# Patient Record
Sex: FEMALE | Race: BLACK OR AFRICAN AMERICAN
[De-identification: names, ages, dates, MRNs, and addresses within clinical notes are randomized per-mention and may not be internally consistent; named-entity substitution may affect disease eponyms.]

---

## 2017-01-05 ENCOUNTER — HOSPITAL ENCOUNTER (OUTPATIENT)
Dept: HOSPITAL 62 - OD | Age: 47
End: 2017-01-05
Attending: NURSE PRACTITIONER
Payer: MEDICARE

## 2017-01-05 DIAGNOSIS — R10.9: Primary | ICD-10-CM

## 2017-01-05 PROCEDURE — 87086 URINE CULTURE/COLONY COUNT: CPT

## 2017-01-18 ENCOUNTER — HOSPITAL ENCOUNTER (OUTPATIENT)
Dept: HOSPITAL 62 - OD | Age: 47
End: 2017-01-18
Attending: FAMILY MEDICINE
Payer: MEDICARE

## 2017-01-18 DIAGNOSIS — M54.6: Primary | ICD-10-CM

## 2017-01-18 PROCEDURE — 72070 X-RAY EXAM THORAC SPINE 2VWS: CPT

## 2017-02-16 ENCOUNTER — HOSPITAL ENCOUNTER (OUTPATIENT)
Dept: HOSPITAL 62 - OD | Age: 47
End: 2017-02-16
Attending: FAMILY MEDICINE
Payer: MEDICARE

## 2017-02-16 DIAGNOSIS — Z01.818: Primary | ICD-10-CM

## 2017-02-16 PROCEDURE — 93010 ELECTROCARDIOGRAM REPORT: CPT

## 2017-02-16 PROCEDURE — 93005 ELECTROCARDIOGRAM TRACING: CPT

## 2017-05-23 ENCOUNTER — HOSPITAL ENCOUNTER (OUTPATIENT)
Dept: HOSPITAL 62 - OD | Age: 47
End: 2017-05-23
Attending: NURSE PRACTITIONER
Payer: MEDICARE

## 2017-05-23 DIAGNOSIS — R10.84: Primary | ICD-10-CM

## 2017-05-23 LAB
ALBUMIN SERPL-MCNC: 4.2 G/DL (ref 3.5–5)
ALP SERPL-CCNC: 86 U/L (ref 38–126)
ALT SERPL-CCNC: 71 U/L (ref 9–52)
ANION GAP SERPL CALC-SCNC: 11 MMOL/L (ref 5–19)
AST SERPL-CCNC: 45 U/L (ref 14–36)
BASOPHILS # BLD AUTO: 0.1 10^3/UL (ref 0–0.2)
BASOPHILS NFR BLD AUTO: 1.1 % (ref 0–2)
BILIRUB DIRECT SERPL-MCNC: 0.4 MG/DL (ref 0–0.4)
BILIRUB SERPL-MCNC: 0.5 MG/DL (ref 0.2–1.3)
BUN SERPL-MCNC: 16 MG/DL (ref 7–20)
CALCIUM: 9.8 MG/DL (ref 8.4–10.2)
CHLORIDE SERPL-SCNC: 102 MMOL/L (ref 98–107)
CO2 SERPL-SCNC: 28 MMOL/L (ref 22–30)
CREAT SERPL-MCNC: 0.71 MG/DL (ref 0.52–1.25)
EOSINOPHIL # BLD AUTO: 0.1 10^3/UL (ref 0–0.6)
EOSINOPHIL NFR BLD AUTO: 0.9 % (ref 0–6)
ERYTHROCYTE [DISTWIDTH] IN BLOOD BY AUTOMATED COUNT: 13.4 % (ref 11.5–14)
GLUCOSE SERPL-MCNC: 89 MG/DL (ref 75–110)
HCT VFR BLD CALC: 33.4 % (ref 36–47)
HGB BLD-MCNC: 10.7 G/DL (ref 12–15.5)
HGB HCT DIFFERENCE: -1.3
LIPASE SERPL-CCNC: 32.4 U/L (ref 23–300)
LYMPHOCYTES # BLD AUTO: 2.9 10^3/UL (ref 0.5–4.7)
LYMPHOCYTES NFR BLD AUTO: 50.8 % (ref 13–45)
MCH RBC QN AUTO: 26.2 PG (ref 27–33.4)
MCHC RBC AUTO-ENTMCNC: 32.2 G/DL (ref 32–36)
MCV RBC AUTO: 81 FL (ref 80–97)
MONOCYTES # BLD AUTO: 0.4 10^3/UL (ref 0.1–1.4)
MONOCYTES NFR BLD AUTO: 7.2 % (ref 3–13)
NEUTROPHILS # BLD AUTO: 2.3 10^3/UL (ref 1.7–8.2)
NEUTS SEG NFR BLD AUTO: 40 % (ref 42–78)
POTASSIUM SERPL-SCNC: 3.8 MMOL/L (ref 3.6–5)
PROT SERPL-MCNC: 7.3 G/DL (ref 6.3–8.2)
RBC # BLD AUTO: 4.11 10^6/UL (ref 3.72–5.28)
SODIUM SERPL-SCNC: 140.7 MMOL/L (ref 137–145)
WBC # BLD AUTO: 5.7 10^3/UL (ref 4–10.5)

## 2017-05-23 PROCEDURE — 83690 ASSAY OF LIPASE: CPT

## 2017-05-23 PROCEDURE — 80053 COMPREHEN METABOLIC PANEL: CPT

## 2017-05-23 PROCEDURE — 36415 COLL VENOUS BLD VENIPUNCTURE: CPT

## 2017-05-23 PROCEDURE — 85025 COMPLETE CBC W/AUTO DIFF WBC: CPT

## 2017-09-01 ENCOUNTER — HOSPITAL ENCOUNTER (OUTPATIENT)
Dept: HOSPITAL 62 - WI | Age: 47
End: 2017-09-01
Attending: FAMILY MEDICINE
Payer: MEDICARE

## 2017-09-01 DIAGNOSIS — Z12.31: Primary | ICD-10-CM

## 2017-09-01 PROCEDURE — 77067 SCR MAMMO BI INCL CAD: CPT

## 2017-09-01 PROCEDURE — G0202 SCR MAMMO BI INCL CAD: HCPCS

## 2017-09-01 NOTE — WOMENS IMAGING REPORT
EXAM DESCRIPTION:  BILAT SCREENING MAMMO W/CAD



COMPLETED DATE/TIME:  9/1/2017 8:39 am



REASON FOR STUDY:  SCREENING MAMMO Z12.31  ENCNTR SCREEN MAMMOGRAM FOR MALIGNANT NEOPLASM OF JAMIE



COMPARISON:  Multiple mammograms since 2009



TECHNIQUE:  Standard craniocaudal and mediolateral oblique views of each breast recorded using digita
l acquisition.



LIMITATIONS:  None.



FINDINGS:  Findings present which are benign by mammographic criteria.  No suspicious masses, calcifi
cations or architectural distortion.

Pertinent benign findings: Stable benign left retroareolar breast parenchymal calcification

Read with the assistance of CAD.

.[facility] - R2 Cenova Version 1.3

.[facility] - R2 Cenova Version 1.3

.[facility] - R2 Cenova Version 2.4

.[facility] - R2 Cenova Version 2.4

.[facility] - R2  Version 9.2

Benign mammographic findings may include one or more of the following:  Smooth masses, popcorn/rim/co
arse calcifications, asymmetries, post-procedure changes, and lesions with long-standing stability.



IMPRESSION:  BENIGN MAMMOGRAPHIC FINDINGS.  BIRADS 2



BREAST DENSITY:  b. There are scattered areas of fibroglandular density.



BIRAD:  2 BENIGN FINDING(S)



RECOMMENDATION:  ROUTINE SCREENING

Please consider bilateral screening tomosynthesis in September 2018



COMMENT:  The patient has been notified of the results by letter per MQSA requirements. Additional no
tification policies are in place for contacting patient with suspicious or incomplete findings.

Quality ID #225: The American College of Radiology recommends an annual screening mammogram for women
 aged 40 years or over. This facility utilizes a reminder system to ensure that all patients receive 
reminder letters, and/or direct phone calls for appointments. This includes reminders for routine scr
eening mammograms, diagnostic mammograms, or other Breast Imaging Interventions when appropriate.  Th
is patient will be placed in the appropriate reminder system.

The American College of Radiology (ACR) has developed recommendations for screening MRI of the breast
s in certain patient populations, to be used in conjunction with mammography.  Breast MRI surveillanc
e may be appropriate for women with more than 20% lifetime risk of developing breast cancer  as deter
mined by genetic testing, significant family history of the disease, or history of mantle radiation f
or Hodgkins Disease.  ACR Practice Guidelines 2008.



TECHNICAL DOCUMENTATION:  FINDING NUMBER: (1)

ASSESSMENT: (1)

JOB ID:  [id number]

 2011 [employer]- All Rights Reserved

## 2018-02-16 ENCOUNTER — HOSPITAL ENCOUNTER (OUTPATIENT)
Dept: HOSPITAL 62 - OD | Age: 48
End: 2018-02-16
Attending: FAMILY MEDICINE
Payer: MEDICARE

## 2018-02-16 DIAGNOSIS — M79.641: Primary | ICD-10-CM

## 2018-02-16 NOTE — RADIOLOGY REPORT (SQ)
EXAM DESCRIPTION:  HAND RIGHT 3 VIEWS



COMPLETED DATE/TIME:  2/16/2018 4:41 pm



REASON FOR STUDY:  PAIN IN RIGHT HAND M79.641  PAIN IN RIGHT HAND



COMPARISON:  None.



EXAM PARAMETERS:  NUMBER OF VIEWS: Three views.

TECHNIQUE: AP, lateral and oblique  radiographic images acquired of the right hand.

LIMITATIONS: None.



FINDINGS:  MINERALIZATION: Normal.

BONES: No acute fracture or dislocation.  No worrisome bone lesions.

JOINTS: Joint spaces maintained.

SOFT TISSUES: No metallic foreign bodies.

OTHER: No other significant finding.



IMPRESSION:  Nothing acute.  No fracture identified.



TECHNICAL DOCUMENTATION:  JOB ID:  [id number]

 2011 [employer]- All Rights Reserved

## 2018-06-26 ENCOUNTER — HOSPITAL ENCOUNTER (OUTPATIENT)
Dept: HOSPITAL 62 - OD | Age: 48
End: 2018-06-26
Attending: FAMILY MEDICINE
Payer: MEDICARE

## 2018-06-26 DIAGNOSIS — M25.511: Primary | ICD-10-CM

## 2018-06-26 NOTE — RADIOLOGY REPORT (SQ)
EXAM DESCRIPTION:  SHOULDER RIGHT 2 OR MORE VIEWS



COMPLETED DATE/TIME:  6/26/2018 11:15 am



REASON FOR STUDY:  PAIN IN RIGHT SHOULDER M25.511  PAIN IN RIGHT SHOULDER



COMPARISON:  None.



NUMBER OF VIEWS:  Three views.



TECHNIQUE:  Internal rotation, external rotation, and Y view images acquired of the right shoulder.



LIMITATIONS:  None.



FINDINGS:  MINERALIZATION: Osteopenic

BONES: No acute fracture or dislocation.  No worrisome bone lesions.

JOINTS: No acromioclavicular joint widening.  No glenohumeral joint dislocation.

VISUALIZED LUNGS AND RIBS: No pneumothorax.  No rib fracture.

SOFT TISSUES: No radiopaque foreign body.

OTHER: No other significant finding.



IMPRESSION:  No acute fracture or malalignment.  No narrowing of the subacromial space.  No bulky bon
y spurring.



TECHNICAL DOCUMENTATION:  JOB ID:  [id number]

 2011 [employer]- All Rights Reserved



Reading location - IP/workstation name: [facility]

## 2018-10-10 ENCOUNTER — HOSPITAL ENCOUNTER (OUTPATIENT)
Dept: HOSPITAL 62 - WI | Age: 48
End: 2018-10-10
Attending: FAMILY MEDICINE
Payer: MEDICARE

## 2018-10-10 DIAGNOSIS — Z12.31: Primary | ICD-10-CM

## 2018-10-10 PROCEDURE — 77067 SCR MAMMO BI INCL CAD: CPT

## 2018-10-10 NOTE — WOMENS IMAGING REPORT
EXAM DESCRIPTION:  BILAT SCREENING MAMMO W/CAD



COMPLETED DATE/TIME:  10/10/2018 7:47 am



REASON FOR STUDY:  SCREENING MAMMO Z12.31  ENCNTR SCREEN MAMMOGRAM FOR MALIGNANT NEOPLASM OF JAMIE



COMPARISON:  2009 through 2017.



TECHNIQUE:  Standard craniocaudal and mediolateral oblique views of each breast recorded using digita
l acquisition.



LIMITATIONS:  None.



FINDINGS:  No masses, calcifications or architectural distortion. No areas of suspicion.

Read with the assistance of CAD.

.[facility] - R2 Cenova Version 1.3

.[facility] - R2 Cenova Version 1.3

.[facility] - R2 Cenova Version 2.4

.[facility] [place] - R2 Cenova Version 2.4

.[facility] - R2  Version 9.2



IMPRESSION:  NORMAL MAMMOGRAM.  BIRADS 1.



BREAST DENSITY:  c. The breasts are heterogeneously dense, which may obscure small masses.



BIRAD:  1 NEGATIVE



RECOMMENDATION:  ROUTINE SCREENING



COMMENT:  The patient has been notified of the results by letter per SA requirements. Additional no
tification policies are in place for contacting patient with suspicious or incomplete findings.

Quality ID #225: The American College of Radiology recommends an annual screening mammogram for women
 aged 40 years or over. This facility utilizes a reminder system to ensure that all patients receive 
reminder letters, and/or direct phone calls for appointments. This includes reminders for routine scr
eening mammograms, diagnostic mammograms, or other Breast Imaging Interventions when appropriate.  Th
is patient will be placed in the appropriate reminder system.

The American College of Radiology (ACR) has developed recommendations for screening MRI of the breast
s in certain patient populations, to be used in conjunction with mammography.  Breast MRI surveillanc
e may be appropriate for women with more than 20% lifetime risk of developing breast cancer  as deter
mined by genetic testing, significant family history of the disease, or history of mantle radiation f
or Hodgkins Disease.  ACR Practice Guidelines 2008.



TECHNICAL DOCUMENTATION:  FINDING NUMBER: (1)

ASSESSMENT: (1)

JOB ID:  [id number]

 2011 [employer]- All Rights Reserved



Reading location - IP/workstation name: [email or web address]

## 2019-09-20 ENCOUNTER — HOSPITAL ENCOUNTER (OUTPATIENT)
Dept: HOSPITAL 62 - RAD | Age: 49
End: 2019-09-20
Attending: NEUROLOGICAL SURGERY
Payer: MEDICARE

## 2019-09-20 DIAGNOSIS — M46.86: Primary | ICD-10-CM

## 2019-09-20 DIAGNOSIS — M54.5: ICD-10-CM

## 2019-09-20 PROCEDURE — 78305 BONE IMAGING MULTIPLE AREAS: CPT

## 2019-09-20 PROCEDURE — A9561 TC99M OXIDRONATE: HCPCS

## 2019-09-20 NOTE — RADIOLOGY REPORT (SQ)
EXAM DESCRIPTION:  NM BONE SCAN LIMITED



COMPLETED DATE/TIME:  9/20/2019 12:53 pm



REASON FOR STUDY:  L4 LESION SEEN ON CT (9/13/19), LUMBAR PAIN (M54.5) M54.5  LOW BACK PAIN



COMPARISON:  Outside CT lumbar spine



RADIONUCLIDE AND DOSE:  21.5 millicuries Tc99m HDP.

The route of agent administration: Intravenous.



ADDITIONAL DRUGS AND DOSES:  None.



TECHNIQUE:  Routine delayed images at 3 hour post radionuclide injection acquired of the bony skeleto
n including anterior and posterior whole-body projections and additional focused images as needed.



LIMITATIONS:  None.



FINDINGS:  BONES: Mild increased uptake at L5.  This in keeping with facet arthropathy demonstrated o
n outside CT.  There is mild increased uptake in the mid dorsal spine this appears to be T7-T8 most l
ikely degenerative as well review of plain films January 18th reveal degenerative changes throughout 
the dorsal spine no obvious compression.

KIDNEYS: Symmetric excretion without obstruction.

OTHER: No other significant finding.



IMPRESSION:  Uptake at L5 consistent with facet arthropathy.  The uptake is symmetric bilaterally.

Uptake in the mid dorsal spine most likely degenerative as well.  If the patient has symptoms correla
tion with MRI or CT is recommended for further evaluation.



COMMENT:  Quality measure 147:  Current bone scan is compared with any available plain radiographs, p
rior bone scans, and CT/MRI.



TECHNICAL DOCUMENTATION:  JOB ID:  [id number]

 2011 [employer]- All Rights Reserved



Reading location - IP/workstation name: [email or web address]

## 2019-12-27 ENCOUNTER — HOSPITAL ENCOUNTER (OUTPATIENT)
Dept: HOSPITAL 62 - WI | Age: 49
End: 2019-12-27
Attending: FAMILY MEDICINE
Payer: MEDICARE

## 2019-12-27 DIAGNOSIS — Z12.31: Primary | ICD-10-CM

## 2019-12-27 PROCEDURE — 77067 SCR MAMMO BI INCL CAD: CPT

## 2019-12-30 NOTE — WOMENS IMAGING REPORT
EXAM DESCRIPTION:  BILAT SCREENING MAMMO W/CAD



COMPLETED DATE/TIME:  12/27/2019 10:55 am



REASON FOR STUDY:  Z12.31 SCREENING MAMMO Z12.31  ENCNTR SCREEN MAMMOGRAM FOR MALIGNANT NEOPLASM OF B
RE



COMPARISON:  10/10/2018 and 9/1/2017.



EXAM PARAMETERS:  Standard craniocaudal and mediolateral oblique views of each breast recorded using 
digital acquisition.

Read with the assistance of CAD.

.[facility] - [employer]  Version 9.2



LIMITATIONS:  None.



FINDINGS:  No suspicious masses, suspicious calcifications or architectural distortion. No areas of c
oncern.



IMPRESSION:  Negative MAMMOGRAM.  BIRADS 1



BREAST DENSITY:  b. There are scattered areas of fibroglandular density.



BIRAD:  ASSESSMENT:  1 NEGATIVE



RECOMMENDATION:  ROUTINE SCREENING



COMMENT:  The patient has been notified of the results by letter per MQSA requirements. Additional no
tification policies are in place for contacting patient with suspicious or incomplete findings.

Quality ID #225: The American College of Radiology recommends an annual screening mammogram for women
 aged 40 years or over. This facility utilizes a reminder system to ensure that all patients receive 
reminder letters, and/or direct phone calls for appointments. This includes reminders for routine scr
eening mammograms, diagnostic mammograms, or other Breast Imaging Interventions when appropriate.  Th
is patient will be placed in the appropriate reminder system.



TECHNICAL DOCUMENTATION:  FINDING NUMBER: (1)

ASSESSMENT: (1)

JOB ID:  [id number]

 2011 [employer]- All Rights Reserved



Reading location - IP/workstation name: [email or web address]

## 2020-01-13 ENCOUNTER — HOSPITAL ENCOUNTER (OUTPATIENT)
Dept: HOSPITAL 62 - RAD | Age: 50
End: 2020-01-13
Attending: PHYSICAL MEDICINE & REHABILITATION
Payer: MEDICARE

## 2020-01-13 DIAGNOSIS — Z86.011: ICD-10-CM

## 2020-01-13 DIAGNOSIS — J34.89: Primary | ICD-10-CM

## 2020-01-13 PROCEDURE — 70553 MRI BRAIN STEM W/O & W/DYE: CPT

## 2020-01-13 PROCEDURE — A9576 INJ PROHANCE MULTIPACK: HCPCS

## 2020-01-13 NOTE — RADIOLOGY REPORT (SQ)
EXAM DESCRIPTION:  MRI HEAD COMBO



COMPLETED DATE/TIME:  1/13/2020 4:56 pm



REASON FOR STUDY:  (Z86.011)PERSONAL HISTORY OF BENIGN NEOPLASM OF THE BRAIN Z86.011  PERSONAL HISTOR
Y OF BENIGN NEOPLASM OF THE BRAIN



COMPARISON:  4/26/2013, 1/9/2012



TECHNIQUE:  Multiplanar imaging includes noncontrasted T1, T2, FLAIR, diffusion with ADC map and post
gadolinium contrast T1 sequences. Images stored on PACS.



CONTRAST TYPE AND DOSE:  10 mL mL Dotarem.



RENAL FUNCTION:  Not indicated.  ACR Type II contrast agent associated with few, if any, unconfounded
 cases of NSF



LIMITATIONS:  None.



FINDINGS:  ANATOMY: No anomalies. Normal vascular flow voids. Pituitary fossa normal.

CSF SPACES: Normal in size and contour. No hemorrhage.

CEREBRUM: Sulci and gyri normal in size and contour. Normal white matter signal on FLAIR imaging. No 
evidence of hemorrhage, mass, or extraaxial fluid collection. No abnormal enhancement post contrast.

POSTERIOR FOSSA: No signal alteration. No hemorrhage. No edema, masses, or mass effect. Internal edward
tory canals, cerebellopontine angles, mastoids normal. No enhancing lesions. No abnormal enhancement 
post contrast.  Low lying right cerebellar tonsil is again noted.

DIFFUSION IMAGING: Negative for acute or subacute infarction.

ORBITS: No masses. Globes normal.

PARANASAL SINUSES: Mucous retention cyst or polyp is again noted in the left maxillary sinus.

OTHER: No other significant finding.



IMPRESSION:  1. No acute intracranial event.

2.  Stable low lying right cerebellar tonsil.

3.  Stable mucous retention cyst or polyp in the left maxillary sinus.

EVIDENCE OF ACUTE STROKE: NO.



TECHNICAL DOCUMENTATION:  JOB ID:  [id number]

 2011 [employer]- All Rights Reserved



Reading location - IP/workstation name: [facility] [place]

## 2021-01-21 ENCOUNTER — HOSPITAL ENCOUNTER (OUTPATIENT)
Dept: HOSPITAL 62 - WI | Age: 51
End: 2021-01-21
Attending: FAMILY MEDICINE
Payer: MEDICARE

## 2021-01-21 DIAGNOSIS — Z12.31: Primary | ICD-10-CM

## 2021-01-21 PROCEDURE — 77063 BREAST TOMOSYNTHESIS BI: CPT

## 2021-01-21 PROCEDURE — 77067 SCR MAMMO BI INCL CAD: CPT

## 2021-01-21 NOTE — WOMENS IMAGING REPORT
EXAM DESCRIPTION:  3D SCREENING MAMMO BILAT



IMAGES COMPLETED DATE/TIME:  1/21/2021 9:02 am



REASON FOR STUDY:  Z12.31 ENCOUNTER FOR SCREENING MAMMOGRAM FOR MALIGNANT NEOPLASM OF BREAST Z12.31  
ENCNTR SCREEN MAMMOGRAM FOR MALIGNANT NEOPLASM OF JAMIE



COMPARISON:  12/27/2019, 10/10/2018, 9/1/2017



EXAM PARAMETERS:  Views: Standard craniocaudal and mediolateral oblique views of each breast recorded
 using digital acquisition and breast tomosynthesis.

Read with the assistance of CAD.

.[facility] - [employer]  Version 9.2



LIMITATIONS:  None.



FINDINGS:  No suspicious masses, suspicious calcifications or architectural distortion. No areas of c
oncern.



IMPRESSION:   NEGATIVE MAMMOGRAM. BIRADS 1.



BREAST DENSITY:  b. There are scattered areas of fibroglandular density.



BIRAD:  ASSESSMENT:  1 NEGATIVE



RECOMMENDATION:  ROUTINE SCREENING



COMMENT:  The patient has been notified of the results by letter per MQSA requirements. Additional no
tification policies are in place for contacting patient with suspicious or incomplete findings.

Quality ID #225: The American College of Radiology recommends an annual screening mammogram for women
 aged 40 years or over. This facility utilizes a reminder system to ensure that all patients receive 
reminder letters, and/or direct phone calls for appointments. This includes reminders for routine scr
eening mammograms, diagnostic mammograms, or other Breast Imaging Interventions when appropriate.  Th
is patient will be placed in the appropriate reminder system.



TECHNICAL DOCUMENTATION:  FINDING NUMBER: (1)

ASSESSMENT:  (1)

JOB ID:  [id number]

 2011 [employer]- All Rights Reserved



Reading location - IP/workstation name: [email or web address]

## 2021-01-21 NOTE — XMS REPORT
Patient Summary Document

                           Created on:2021



Patient:OSCAR LAN

Sex:Female

:1970

External Reference #:[id number]





Demographics







                          Address                   [address] 



                                                    [place] [address]

 

                          Home Phone                [phone]

 

                          Work Phone                [phone]

 

                          Mobile Phone              [phone]

 

                          Email Address             [email or web address]

 

                          Preferred Language        English

 

                          Marital Status            Unknown

 

                          Caodaism Affiliation     Unknown

 

                          Race                      Unknown

 

                          Additional Race(s)        Unavailable



                                                    Black or 



                                                    Unavailable



                                                    Unavailable

 

                          Ethnic Group              Unknown









Author







                          Organization              [facility]

 

                          Address                   [facility] [address]



                                                    [place] [address]

 

                          Phone                     [phone]









Care Team Providers







                    Name                Role                Phone

 

                    Roc Livingston      Primary Care Physician Unavailable

 

                    FRANNY GUEVARA Attending Clinician Unavailable

 

                    ILYA MARIE    Attending Clinician Unavailable

 

                    MUSA SUN        Attending Clinician Unavailable

 

                    LOY             Attending Clinician Unavailable

 

                    Yara             Attending Clinician Unavailable

 

                    Yara             Attending Clinician Unavailable









Allergies, Adverse Reactions, Alerts







        Allergy Allergy Status  Severity Reaction(s) Onset   Inactive Treating C

omments



        Name    Type                            Date    Date    Clinician 

 

        Iodinated Propensity Active          Hives   2020                  



        Contrast to adverse                         1-04                    



        Media   reactions                         00:00:                  



                to drug                         00                      

 

        Potassium Propensity Active          Hives   1                  



                to adverse                         1-04                    



                reactions                         00:00:                  



                to drug                         00                      

 

        Ampicillin Propensity Active          Rash    2020-0                  



                to adverse                         3-19                    



                reactions                         00:00:                  



                to drug                         00                      

 

        Citrus And Propensity Active          Rash    2020-0                  



        Derivatives to adverse                         3-19                    



                reactions                         00:00:                  



                to drug                         00                      

 

        Citrus And Propensity Inactive         Rash    2020-0                  



        Derivatives to adverse                         3-19                    



                reactions                         00:00:                  



                to drug                         00                      

 

        Shellfish Propensity Active          Rash    2020-0                  



        Containing to adverse                         3-19                    



        Products reactions                         00:00:                  



                to drug                         00                      

 

        Ampicillin Allergy to Active  Moderate Hives   2012                  



                substance                         -15                    



                                                00:00:                  



                                                00                      

 

        Iodinated Allergy to Active  Moderate Hives   2012                  



        Contrast substance                         -15                    



        Media                                   00:00:                  



                                                00                      

 

        Iodine  Allergy to Active  Moderate Hives   2012                  



                substance                         1-15                    



                                                00:00:                  



                                                00                      

 

        Potassium Allergy to Active  Moderate Hives   2012                  



                substance                         1-15                    



                                                00:00:                  



                                                00                      

 

        Shellfish Allergy to Active                                          



        Derived substance                                                 







Medications







       Ordered Filled Start  Stop   Current Ordering Indication Dosage Frequency

 Signature

                          Comments                  Components



      Medication Medication Date  Date  Medication? Clinician                   

(SIG)       



      Name  Name                                                        

 

      tiZANidine       2020-0       Yes               2mg   [id number] Take 0.5-

1       



      (ZANAFLEX)       9-24                                [id number] tablets   

    



      4 MG tablet       00:00:                               3D    (2-4 mg      

 



                  00                                        total) by       



                                                            mouth 3       



                                                            (three)       



                                                            times       



                                                            daily as       



                                                            needed       

 

      HYDROcodone       2020-0       Yes               1{tbl} [id number] Take 1 

      



      -acetaminop       8-31                                [id number] tablet by

       



      hen (NORCO)       00:00:                               3D    mouth 3      

 



      5-325 mg       00                                        (three)       



      tablet                                                 times       



                                                            daily as       



                                                            needed       



                                                            Patient       



                                                            taking due       



                                                            to          



                                                            surgery.       

 

      predniSONE       2020-0       Yes               20mg  Q.5D  Take 20 mg    

   



      (DELTASONE)       8-22                                      by mouth 2    

   



      20 MG       00:00:                                     (two)       



      tablet       00                                        times       



                                                            daily For       



                                                            5 days.       

 

      DULoxetine       -0       Yes               30mg  QD    Take 1       



      (CYMBALTA)       1-27                                      capsule       



      30 MG DR       00:00:                                     (30 mg       



      capsule       00                                        total) by       



                                                            mouth once       



                                                            daily for       



                                                            30 days       

 

      losartan-hy                   Yes               1{tbl} QD    Take 1       



      drochloroth                                                 tablet by     

  



      iazide                                                 mouth once       



      (HYZAAR)                                                 daily       



      100-12.5 mg                                                             



      tablet                                                             

 

      fexofenadin                   Yes               180mg QD    Take 180      

 



      e (ALLEGRA)                                                 mg by       



      180 MG                                                 mouth once       



      tablet                                                 daily       

 

      acetaminoph                   Yes               650mg QD    Take 650      

 



      en                                                    mg by       



      (TYLENOL)                                                 mouth once      

 



      650 MG ER                                                 daily       



      tablet                                                             

 

      multivitami                   Yes                           Take by       



      n with                                                 mouth       



      minerals                                                             



      (HAIR,SKIN                                                             



      AND NAILS                                                             



      ORAL)                                                             

 

      aspirin 325                   Yes               325mg QD    Take 325      

 



      MG tablet                                                 mg by       



                                                            mouth once       



                                                            daily       

 

      inulin-                   Yes               2{each} QD    Take 2      

 



      mium                                                  each by       



      picolinate                                                 mouth once     

  



      2-100                                                 daily       



      gram-mcg                                                             



      Chew                                                              

 

      biotin 1 mg                   Yes               1{capsu QD    Take 1      

 



      Cap                                       le}         capsule by       



                                                            mouth once       



                                                            daily       

 

      docusate                   Yes               100mg QD    Take 100       



      (COLACE)                                                 mg by       



      100 MG                                                 mouth once       



      capsule                                                 daily       

 

      baclofen 10                   No                            baclofen      

 



      mg tablet                                                 10 mg       



                                                            tablet       

 

      diazepam 5                   No                            diazepam 5     

  



      mg tablet                                                 mg tablet       

 

      diclofenac                   No                            diclofenac     

  



      sodium 75                                                 sodium 75       



      mg                                                    mg          



      tablet,vanita                                                 tablet,del    

   



      yed release                                                 ayed        



      TAKE 1                                                 release       



      TABLET BY                                                 TAKE 1       



      MOUTH TWICE                                                 TABLET BY     

  



      A DAY                                                 MOUTH       



                                                            TWICE A       



                                                            DAY         

 

      Gavilyte-C                   No                            Gavilyte-C     

  



      240                                                   240         



      gram-22.72                                                 gram-22.72     

  



      gram-6.72                                                 gram-6.72       



      gram-5.84                                                 gram-5.84       



      gram oral                                                 gram oral       



      solution                                                 solution       

 

      losartan                   No                            losartan       



      100                                                   100         



      mg-hydrochl                                                 mg-hydroch    

   



      orothiazide                                                 lorothiazi    

   



      12.5 mg                                                 de 12.5 mg       



      tablet TK 1                                                 tablet TK     

  



      T PO  QD                                                 1 T PO QD       

 

      meloxicam                   No                            meloxicam       



      15 mg                                                 15 mg       



      tablet TK 1                                                 tablet TK     

  



      T PO QD                                                 1 T PO QD       

 

      pantoprazol                   No                            pantoprazo    

   



      e 20 mg                                                 le 20 mg       



      tablet,vanita                                                 tablet,del    

   



      yed release                                                 ayed        



      TAKE 1                                                 release       



      TABLET BY                                                 TAKE 1       



      MOUTH ONCE                                                 TABLET BY      

 



      DAILY                                                 MOUTH ONCE       



                                                            DAILY       

 

      prednisone                   No                            prednisone     

  



      20 mg                                                 20 mg       



      tablet                                                 tablet       

 

      tizanidine                   No                            tizanidine     

  



      4 mg tablet                                                 4 mg        



                                                            tablet       

 

      tramadol 50                   No                            tramadol      

 



      mg tablet                                                 50 mg       



      Take 1 q                                                 tablet       



      6-8 hrs PRN                                                 Take 1 q      

 



      pain max 2                                                 6-8 hrs       



      per day                                                 PRN pain       



      DNFU                                                  max 2 per       



      20                                                 day DNFU       



                                                            20       

 

      pregabalin                   No                            pregabalin     

  



      50 mg                                                 50 mg       



      capsule                                                 capsule       

 

      duloxetine                   No                            duloxetine     

  



      20 mg                                                 20 mg       



      capsule,del                                                 capsule,de    

   



      ayed                                                  layed       



      release                                                 release       



      TAKE 1                                                 TAKE 1       



      CAPSULE BY                                                 CAPSULE BY     

  



      MOUTH ONCE                                                 MOUTH ONCE     

  



      DAILY X6                                                 DAILY X6       



      WK, 2                                                 WK, 2       



      CAPSULES                                                 CAPSULES       



      ONCE DAILY                                                 ONCE DAILY     

  



      X6 WK, THEN                                                 X6 WK,       



      3CAPS ONCE                                                 THEN 3CAPS     

  



      DAILY                                                 ONCE DAILY       

 

      duloxetine                   No                            duloxetine     

  



      30 mg                                                 30 mg       



      capsule,del                                                 capsule,de    

   



      ayed                                                  layed       



      release                                                 release       

 

      hydroxyzine                   No                            hydroxyzin    

   



      pamoate 25                                                 e pamoate      

 



      mg capsule                                                 25 mg       



                                                            capsule       

 

       mg                   No                             mg     

  



      capsule                                                 capsule       

 

      hydrocodone                   No                            hydrocodon    

   



      5                                                     e 5         



      mg-acetamin                                                 mg-acetami    

   



      ophen 325                                                 nophen 325      

 



      mg tablet                                                 mg tablet       



      Take 1 q                                                 Take 1 q       



      6-8 hrs PRN                                                 6-8 hrs       



      pain                                                  PRN pain       

 

      polyethylen                   No                            polyethyle    

   



      e glycol                                                 ne glycol       



      3350 17                                                 3350 17       



      gram/dose                                                 gram/dose       



      oral powder                                                 oral        



      MIX 1                                                 powder MIX       



      CAPFUL WITH                                                 1 CAPFUL      

 



      8 OUNCES OF                                                 WITH 8       



      FLUID AND                                                 OUNCES OF       



      TK D                                                  FLUID AND       



                                                            TK D        

 

      Tylenol                   No                            Tylenol       



      Arthritis                                                 Arthritis       



      Pain 650 mg                                                 Pain 650      

 



      tablet,exte                                                 mg          



      nded                                                  tablet,ext       



      release                                                 ended       



                                                            release       

 

      aspirin 325                   No                            aspirin       



      mg tablet                                                 325 mg       



      Take 1                                                 tablet       



      tablet(s)                                                 Take 1       



      EVERY DAY                                                 tablet(s)       



      by oral                                                 EVERY DAY       



      route for                                                 by oral       



      10 days                                                 route for       



      starting 3                                                 10 days       



      days after                                                 starting 3     

  



      surgery                                                 days after       



                                                            surgery       

 

      Colace 100                   No                1capsul BID   Colace 100   

    



      mg capsule                                     e(s)        mg capsule     

  



      Take 1                                                 Take 1       



      capsule                                                 capsule       



      twice a day                                                 twice a       



      by oral                                                 day by       



      route as                                                 oral route       



      needed for                                                 as needed      

 



      10 days.                                                 for 10       



                                                            days.       

 

      buprenorphi                   No                1patch( Q1W   buprenorph  

     



      ne 5                                      es)         ine 5       



      mcg/hour                                                 mcg/hour       



      weekly                                                 weekly       



      transdermal                                                 transderma    

   



      patch APPLY                                                 l patch       



      1 PATCH                                                 APPLY 1       



      EXTERNALLY                                                 PATCH       



      TO THE SKIN                                                 EXTERNALLY    

   



      EVERY WEEK                                                 TO THE       



                                                            SKIN EVERY       



                                                            WEEK        

 

      oxycodone-a                   No                1     Q5H   oxycodone-    

   



      cetaminophe                                                 acetaminop    

   



      n 5 mg-325                                                 hen 5       



      mg tablet                                                 mg-325 mg       



      Take 1                                                 tablet       



      tablet                                                 Take 1       



      every 4-6                                                 tablet       



      hours by                                                 every 4-6       



      oral route                                                 hours by       



      as needed.                                                 oral route     

  



                                                            as needed.       

 

      tiZANidine              No                4mg   QD    Take 4 mg      

 



      (ZANAFLEX)                                            by mouth       



      4 MG tablet             00:00                               nightly       



                        :00                                             







Problems







        Condition Condition Condition Status  Onset   Resolution Last    Treatin

g Comments



        Name    Details Category         Date    Date    Treatment Clinician 



                                                        Date            

 

        Chronic Chronic [id number] Active  2020         



        bilateral bilateral                             14:56:33         



        low back low back                 00:00:                          



        pain    pain                    00                              



        without without                                                 



        sciatica sciatica                                                 

 

        Lumbosacral Lumbosacral [id number] Active  2020     

    



        spondylosis spondylosis                             14:56:48        

 



        without without                 00:00:                          



        myelopathy myelopathy                 00                              

 

        HSP     HSP     [id number] Active  2020         



        (hereditary (hereditary                 3-19            13:48:48        

 



        spastic spastic                 00:00:                          



        paraplegia) paraplegia)                 00                              

 

        Spastic Spastic Problem Active                            



        paralysis Paralysis                 1-07                            



                                        00:00:                          



                                        00                              

 

        Thoracic Thoracic Problem Active  2018                          



        spondylosis Spondylosis                 1-06                            



        without without                 00:00:                          



        myelopathy Myelopathy                 00                              

 

        Lumbosacral Lumbosacral Problem Active                            



        spondylosis Spondylosis                 3-27                            



        without without                 00:00:                          



        myelopathy Myelopathy                 00                              

 

        Osteoarthri Osteoarthri Problem Active                            



        tis of knee tis of Knee                 5-04                            



                                        00:00:                          



                                        00                              

 

        Chondromala Chondromala Problem Active  2014                          



        miriam of  miriam of                  1-20                            



        patella Patella                 00:00:                          



                                        00                              

 

        Chondromala Chondromala Problem Active  2014                          



        miriam     miriam                     1-20                            



                                        00:00:                          



                                        00                              

 

        Derangement Derangement Problem Active                            



        of knee of Knee                 6-13                            



                                        00:00:                          



                                        00                              

 

        Anemia  Anemia  Problem Active  2012-1                          



                                        1-15                            



                                        00:00:                          



                                        00                              

 

        Knee pain Knee Pain Problem Active  2012-1                          



                                        1-15                            



                                        00:00:                          



                                        00                              

 

        History of History of Problem Active  2012                          



        cardiovascu Cardiovascu                 1-15                            



        lar disease lar Disease                 00:00:                          



                                        00                              







Procedures







                Procedure       Date / Time Performed Performing Clinician Devic e

 

                XR, lumbar spine 2020 00:00:00                 

 

                XR, lumbar spine 2020-11-10 00:00:00                 

 

                Lumbar Spine Surgery (Surg) 2020-10-26 00:00:00                 

 

                CT, lumbar spine, w/o contrast 2020 00:00:00              

   

 

                lumbar spine surgery (SURG) 2020 00:00:00                 

 

                XR, lumbar spine 2020 00:00:00                 

 

                MRI, lumbar spine, w/o contrast 2020 00:00:00             

    

 

                XR, lumbar spine 2020 00:00:00                 

 

                MRI, brain, w/wo contrast 2019 00:00:00                 

 

                OFFICE/OUTPATIENT VISIT EST 2019 13:30:00                 

 

                MRI, lumbar spine, w/o contrast 2019 00:00:00             

    

 

                MRI, thoracic spine, w/o contrast 2019 00:00:00           

      

 

                OFFICE/OUTPATIENT VISIT EST 2018-10-08 08:15:00                 

 

                OFFICE/OUTPATIENT VISIT EST 2018 10:45:00                 

 

                OFFICE/OUTPATIENT VISIT EST 2018 15:15:00                 

 

                Repair, Collateral Ligament (Surg) 2017 00:00:00          

       

 

                OFFICE/OUTPATIENT VISIT EST 2017 10:15:00                 

 

                Hand Surgery    2017 00:00:00                 

 

                Foot/Ankle Surgery 1994 00:00:00                 

 

                Cholecystectomy                                 

 

                Knee Surgery                                    

 

                Other                                           

 

                Hysterectomy                                    

 

                HIP ARTHROSCOPY DX                                 

 

                KNEE ARTHROSCOPY/SURGERY                                 

 

                Ankle Surgery                                   







Results







           Test Description Test Time  Test Comments Text Results Atomic Results

 Result 

Comments









                HEMOGLOBIN AND HEMATOCRIT 2020-10-27 03:56:00                 









                          Test Item    Value        Reference Range Comments









                Hct VFr Bld Auto (test code = 4544-3) 34.7 %          34.1-44.9 

      

 

                Hgb Bld-mCnc (test code = 718-7) 10.5 g/dL       11.2-15.7      

 



IRON AND IRON BINDING CAPACITY PANEL2020-10-20 09:00:00





                Test Item       Value           Reference Range Comments

 

                Iron Satn MFr SerPl (test code = 2502-3) 20 %            20-50  

         

 

                TIBC SerPl-mCnc (test code = 2500-7) 253 ug/dL       250-425    

     

 

                Iron SerPl-mCnc (test code = 2498-4) 50 ug/dL             

     



FERRITIN2020-10-20 09:00:00





                Test Item       Value           Reference Range Comments

 

                Ferritin SerPl-mCnc (test code = 2276-4) 134.0 ng/mL     7.3-307

.3       



HEMOGLOBIN AND HEMATOCRIT2020-10-20 09:00:00





                Test Item       Value           Reference Range Comments

 

                Hct VFr Bld Auto (test code = 4544-3) 33.5 %          34.1-44.9 

      

 

                Hgb Bld-mCnc (test code = 718-7) 10.1 g/dL       11.2-15.7      

 



FERRITIN2020-10-05 08:41:00





                Test Item       Value           Reference Range Comments

 

                Ferritin SerPl-mCnc (test code = 2276-4) 189.8 ng/mL     7.3-307

.3       



IRON AND IRON BINDING CAPACITY PANEL2020-10-05 08:41:00





                Test Item       Value           Reference Range Comments

 

                TIBC SerPl-mCnc (test code = 2500-7) 297 ug/dL       250-425    

     

 

                Iron Satn MFr SerPl (test code = 2502-3) 33 %            20-50  

         

 

                Iron SerPl-mCnc (test code = 2498-4) 99 ug/dL             

     



Hemoglobin and Hematocrit panel - Blood2020-10-05 08:41:00





                Test Item       Value           Reference Range Comments

 

                Hemoglobin [Mass/volume] in Blood (test code = 11.3 g/dL       1

1.2-15.7       



                718-7)                                          

 

                Hematocrit [Volume Fraction] of Blood by Automated 35.6 %       

   34.1-44.9       



                count (test code = 4544-3)                                 



creatinine2020-10-05 08:41:00





                Test Item       Value           Reference Range Comments

 

                Creatinine [Mass/volume] in Serum or Plasma (test 0.87 mg/dL    

  0.55-1.02       



                code = 2160-0)                                  

 

                Glomerular filtration rate/1.73 sq M.predicted >=60             

               



                [Volume Rate/Area] in Serum or Plasma by                        

         



                Creatinine-based formula (MDRD) (test code =                    

             



                85078-5)                                        



CBC (INCLUDES DIFF/PLT)2019 10:51:00





                Test Item       Value           Reference Range Comments

 

                MPV (test code = 07250099) 11.7 fL         7.5-12.5        

 

                HEMOGLOBIN (test code = 17645944) 11.6 g/dL       11.7-15.5     

  

 

                ABSOLUTE NEUTROPHILS (test code = 27383807) 2602 cells/uL   1500

-7800       

 

                ABSOLUTE BASOPHILS (test code = 06391564) 61 cells/uL     0-200 

          

 

                RED BLOOD CELL COUNT (test code = 65813232) 4.37 Million/uL 3.80

-5.10       

 

                PLATELET COUNT (test code = 30529891) 304 Thousand/uL 140-400   

      

 

                MCH (test code = 18458917) 26.5 pg         27.0-33.0       

 

                ABSOLUTE LYMPHOCYTES (test code = 60986892) 2448 cells/uL   850-

3900        

 

                MONOCYTES (test code = 84890477) 6.6 %                          

 

 

                LYMPHOCYTES (test code = 90566026) 44.5 %                       

   

 

                EOSINOPHILS (test code = 38507191) 0.5 %                        

   

 

                RDW (test code = 63372686) 12.7 %          11.0-15.0       

 

                ABSOLUTE EOSINOPHILS (test code = 37347187) 28 cells/uL     15-5

00          

 

                HEMATOCRIT (test code = 04562313) 35.7 %          35.0-45.0     

  

 

                MCV (test code = 97961045) 81.7 fL         80.0-100.0      

 

                WHITE BLOOD CELL COUNT (test code = 5.5 Thousand/uL 3.8-10.8    

    



                77816342)                                       

 

                MCHC (test code = 62368770) 32.5 g/dL       32.0-36.0       

 

                NEUTROPHILS (test code = 52519900) 47.3 %                       

   

 

                ABSOLUTE MONOCYTES (test code = 22434930) 363 cells/uL    200-95

0         

 

                BASOPHILS (test code = 16427665) 1.1 %                          

 



BASIC METABOLIC [id number] 11:13:00





                Test Item       Value           Reference Range Comments

 

                POTASSIUM (test code = 90442825) 4.0 mmol/L      3.5-5.3        

 

 

                eGFR  (test code = 74 mL/min/1.73m2 > OR = 60   

    



                29416187)                                       

 

                GLUCOSE (test code = 07114640) 87 mg/dL        65-99           

 

                CARBON DIOXIDE (test code = 84809603) 30 mmol/L       20-32     

      

 

                BUN/CREATININE RATIO (test code = NOT APPLICABLE (calc) 6-22    

        



                07904156)                                       

 

                CHLORIDE (test code = 59268676) 102 mmol/L                

 

                eGFR NON-AFR. AMERICAN (test code = 64 mL/min/1.73m2 > OR = 60  

     



                35583757)                                       

 

                SODIUM (test code = 22826511) 138 mmol/L      135-146         

 

                CALCIUM (test code = 34860354) 9.9 mg/dL       8.6-10.4        

 

                CREATININE (test code = 52193903) 1.02 mg/dL      0.50-1.05     

  

 

                UREA NITROGEN (BUN) (test code = 23 mg/dL        7-           

 



                48660073)                                       



BASIC METABOLIC [id number] 08:45:00





                Test Item       Value           Reference Range Comments

 

                CALCIUM (test code = 93312964) 9.8 mg/dL       8.6-10.4        

 

                CHLORIDE (test code = 10900610) 105 mmol/L                

 

                UREA NITROGEN (BUN) (test code = 21 mg/dL        7-25           

 



                05625841)                                       

 

                SODIUM (test code = 00972212) 140 mmol/L      135-146         

 

                CARBON DIOXIDE (test code = 19708161) 27 mmol/L       20-32     

      

 

                POTASSIUM (test code = 52403031) 3.8 mmol/L      3.5-5.3        

 

 

                GLUCOSE (test code = 07121163) 99 mg/dL        65-99           

 

                BUN/CREATININE RATIO (test code = NOT APPLICABLE (calc) 6-22    

        



                10611783)                                       

 

                eGFR  (test code = 76 mL/min/1.73m2 > OR = 60   

    



                06080836)                                       

 

                CREATININE (test code = 81102889) 1.00 mg/dL      0.50-1.05     

  

 

                eGFR NON-AFR. AMERICAN (test code = 66 mL/min/1.73m2 > OR = 60  

     



                76021131)                                       



[id number] 09:51:90516TTBRRV CELL [id number] 09:51:00NEGATIVE
COMPREHENSIVE METABOLIC [id number] 09:51:00





                Test Item       Value           Reference Range Comments

 

                eGFR  (test code = 52 mL/min/1.73m2 > OR = 60   

    



                46721199)                                       

 

                CREATININE (test code = 49843043) 1.37 mg/dL      0.50-1.05     

  

 

                ALT (test code = 69557533) 14 U/L          6-29            

 

                CALCIUM (test code = 99293950) 10.1 mg/dL      8.6-10.4        

 

                POTASSIUM (test code = 71953566) 4.1 mmol/L      3.5-5.3        

 

 

                ALBUMIN/GLOBULIN RATIO (test code = 1.4 (calc)      1.0-2.5     

    



                71907985)                                       

 

                UREA NITROGEN (BUN) (test code = 59207324) 17 mg/dL        7-25 

           

 

                BUN/CREATININE RATIO (test code = 50698997) 12 (calc)       6-22

            

 

                PROTEIN, TOTAL (test code = 33854161) 7.4 g/dL        6.1-8.1   

      

 

                AST (test code = 27172002) 19 U/L          10-35           

 

                eGFR NON-AFR. AMERICAN (test code = 45 mL/min/1.73m2 > OR = 60  

     



                71212758)                                       

 

                ALKALINE PHOSPHATASE (test code = 99735385) 65 U/L          33-1

30          

 

                GLUCOSE (test code = 28555642) 97 mg/dL        65-99           

 

                ALBUMIN (test code = 71671974) 4.3 g/dL        3.6-5.1         

 

                SODIUM (test code = 11585091) 140 mmol/L      135-146         

 

                CARBON DIOXIDE (test code = 23202336) 30 mmol/L       20-32     

      

 

                GLOBULIN (test code = 24208487) 3.1 g/dL (calc) 1.9-3.7         

 

                BILIRUBIN, TOTAL (test code = 90114055) 0.4 mg/dL       0.2-1.2 

        

 

                CHLORIDE (test code = 68312256) 102 mmol/L                



CBC (INCLUDES DIFF/PLT)2019 09:51:00





                Test Item       Value           Reference Range Comments

 

                EOSINOPHILS (test code = 48248442) 1.3 %                        

   

 

                HEMATOCRIT (test code = 61172632) 34.6 %          35.0-45.0     

  

 

                WHITE BLOOD CELL COUNT (test code = 3.8 Thousand/uL 3.8-10.8    

    



                16358898)                                       

 

                MCH (test code = 71106219) 26.3 pg         27.0-33.0       

 

                MCHC (test code = 18544731) 32.9 g/dL       32.0-36.0       

 

                HEMOGLOBIN (test code = 79994553) 11.4 g/dL       11.7-15.5     

  

 

                NEUTROPHILS (test code = 44749830) 36.2 %                       

   

 

                ABSOLUTE EOSINOPHILS (test code = 27427261) 49 cells/uL     15-5

00          

 

                MONOCYTES (test code = 77035153) 9.0 %                          

 

 

                MCV (test code = 86768860) 79.7 fL         80.0-100.0      

 

                ABSOLUTE MONOCYTES (test code = 17582774) 342 cells/uL    200-95

0         

 

                RED BLOOD CELL COUNT (test code = 02453817) 4.34 Million/uL 3.80

-5.10       

 

                ABSOLUTE BASOPHILS (test code = 23446726) 91 cells/uL     0-200 

          

 

                MPV (test code = 27531923) 12.7 fL         7.5-12.5        

 

                PLATELET COUNT (test code = 47144826) 272 Thousand/uL 140-400   

      

 

                ABSOLUTE LYMPHOCYTES (test code = 64760569) 1942 cells/uL   850-

3900        

 

                BASOPHILS (test code = 38591991) 2.4 %                          

 

 

                RDW (test code = 45320604) 13.0 %          11.0-15.0       

 

                ABSOLUTE NEUTROPHILS (test code = 63018270) 1376 cells/uL   1500

-7800       

 

                LYMPHOCYTES (test code = 28470474) 51.1 %                       

   



VITAMIN [id number] 09:51:78975TXOG AND TOTAL IRON BINDING [id number] 
09:51:00





                Test Item       Value           Reference Range Comments

 

                % SATURATION (test code = 45147437) 47 % (calc)     11-50       

    

 

                IRON, TOTAL (test code = 21783478) 125 mcg/dL             

   

 

                IRON BINDING CAPACITY (test code = 267 mcg/dL (calc) 250-450    

     



                01414709)                                       



HEMOGLOBIN A1c WITH eAG2018-10-08 08:56:00





                Test Item       Value           Reference Range Comments

 

                eAG (mg/dL) (test code = 69975778) 108 (calc)                   

   

 

                HEMOGLOBIN A1c (test code = 24157350) 5.4 % of total Hgb <5.7   

         

 

                eAG (mmol/L) (test code = 93631311) 6.0 (calc)                  

    



COMPREHENSIVE METABOLIC PANEL2018-10-08 08:56:00





                Test Item       Value           Reference Range Comments

 

                CREATININE (test code = 05455163) 0.81 mg/dL      0.50-1.05     

  

 

                POTASSIUM (test code = 26145875) 4.2 mmol/L      3.5-5.3        

 

 

                ALT (test code = 32565783) 41 U/L          6-29            

 

                UREA NITROGEN (BUN) (test code = 12 mg/dL        7-25           

 



                03508320)                                       

 

                GLUCOSE (test code = 16934947) 93 mg/dL        65-99           

 

                ALKALINE PHOSPHATASE (test code = 88 U/L                  

  



                46302207)                                       

 

                CARBON DIOXIDE (test code = 45727958) 29 mmol/L       20-32     

      

 

                ALBUMIN (test code = 53246224) 4.3 g/dL        3.6-5.1         

 

                eGFR NON-AFR. AMERICAN (test code = 85 mL/min/1.73m2 > OR = 60  

     



                44436254)                                       

 

                ALBUMIN/GLOBULIN RATIO (test code = 1.5 (calc)      1.0-2.5     

    



                94689377)                                       

 

                BILIRUBIN, TOTAL (test code = 0.3 mg/dL       0.2-1.2         



                96886497)                                       

 

                SODIUM (test code = 37710387) 140 mmol/L      135-146         

 

                AST (test code = 93359413) 28 U/L          10-35           

 

                BUN/CREATININE RATIO (test code = NOT APPLICABLE (calc) 6-22    

        



                42561316)                                       

 

                PROTEIN, TOTAL (test code = 24294260) 7.1 g/dL        6.1-8.1   

      

 

                GLOBULIN (test code = 00905997) 2.8 g/dL (calc) 1.9-3.7         

 

                CALCIUM (test code = 44472960) 9.8 mg/dL       8.6-10.4        

 

                eGFR  (test code = 98 mL/min/1.73m2 > OR = 60   

    



                89288745)                                       

 

                CHLORIDE (test code = 13295078) 105 mmol/L                



LIPID PANEL WITH REFLEX TO DIRECT LDL2018-10-08 08:56:00





                Test Item       Value           Reference Range Comments

 

                TRIGLYCERIDES (test code = 93356622) 54 mg/dL        <150       

     

 

                CHOLESTEROL, TOTAL (test code = 39412900) 172 mg/dL       <200  

          

 

                NON HDL CHOLESTEROL (test code = 56138639) 109 mg/dL (calc) <130

            

 

                CHOL/HDLC RATIO (test code = 58097760) 2.7 (calc)      <5.0     

       

 

                LDL-CHOLESTEROL (test code = 78231573) 95 mg/dL (calc)          

       

 

                HDL CHOLESTEROL (test code = 02655762) 63 mg/dL        >50      

       



TSH2018-10-08 08:56:000.50CBC (H/H, RBC, INDICES, WBC, PLT)2018-10-08 08:56:00





                Test Item       Value           Reference Range Comments

 

                MCH (test code = 63352794) 26.1 pg         27.0-33.0       

 

                RDW (test code = 88310507) 13.3 %          11.0-15.0       

 

                HEMOGLOBIN (test code = 23560168) 11.3 g/dL       11.7-15.5     

  

 

                PLATELET COUNT (test code = 10589335) 285 Thousand/uL 140-400   

      

 

                MCV (test code = 36024854) 80.6 fL         80.0-100.0      

 

                MPV (test code = 50925643) 12.5 fL         7.5-12.5        

 

                MCHC (test code = 95242933) 32.4 g/dL       32.0-36.0       

 

                HEMATOCRIT (test code = 17039902) 34.9 %          35.0-45.0     

  

 

                WHITE BLOOD CELL COUNT (test code = 4.3 Thousand/uL 3.8-10.8    

    



                23184363)                                       

 

                RED BLOOD CELL COUNT (test code = 54755356) 4.33 Million/uL 3.80

-5.10       



Rapid Strep\S\2018 12:05:00





                Test Item       Value           Reference Range Comments

 

                Rapid Strep (test code = RAPIDSTREP) Negative        N/A        

     



LIPASE\S\ 11:45:00





                Test Item       Value           Reference Range Comments

 

                LIPASE (test code = LIPA) 32.4 U/L                  



COMPREHENSIVE METABOLIC PANEL\S\ 11:45:00





                Test Item       Value           Reference Range Comments

 

                EGFR, (test code = GFRAA) > 60            >60   

          

 

                ALBUMIN (test code = ALB) 4.2 g/dL        3.5-5.0         

 

                ASPARTATE AMINO TRANSFERASE (test code = AST) 45 U/L          14

-36           

 

                CARBON DIOXIDE (test code = CO2) 28 mmol/L       22-30          

 

 

                SODIUM (test code = NA) 140.7 mmol/L    137-145         

 

                ALKALINE PHOSPHATASE (test code = ALKP) 86 U/L            

        

 

                CHLORIDE (test code = CL-1) 102 mmol/L                

 

                TOTAL PROTEIN (test code = TP) 7.3 g/dL        6.3-8.2         

 

                EGFR,NON  (test code = GFRN) > 60            >60

             

 

                ALANINE AMINOTRANSFERASE (test code = ALT) 71 U/L          9-52 

           

 

                BLOOD UREA NITROGEN (test code = BUN) 16 mg/dL        7-20      

      

 

                BILIRUBIN,DIRECT (test code = BC) 0.4 mg/dL       0.0-0.4       

  

 

                ANION GAP (test code = ANION) 11              5-19            

 

                GLUCOSE (test code = GLU) 89 mg/dL                  

 

                BILIRUBIN,TOTAL (test code = TBIL) 0.5 mg/dL       0.2-1.3      

   

 

                CALCIUM (test code = CA) 9.8 mg/dL       8.4-10.2        

 

                CREATININE RESULT (test code = CREA) 0.71 mg/dL      0.52-1.25  

     

 

                POTASSIUM (test code = K) 3.8 mmol/L      3.6-5.0         



CBC WITH DIFF\S\-34-31 11:45:00





                Test Item       Value           Reference Range Comments

 

                PLATELET COUNT (test code = PLT) 234 10 3/uL     150-450        

 

 

                HEMATOCRIT (test code = HCT) 33.4 %          36.0-47.0       

 

                RED BLOOD COUNT (test code = RBC) 4.11 10 6/uL    3.72-5.28     

  

 

                MEAN CORPUSCULAR HEMOGLOBIN (test code = MCH) 26.2 pg         27

.0-33.4       

 

                SEGMENTED NEUTROPHILS % (AUTO) (test code = 40.0 %          42-7

8           



                SEG%)                                           

 

                ABSOLUTE LYMPHOCYTES (AUTO) (test code = LY#) 2.9 10 3/uL     0.

5-4.7         

 

                WHITE BLOOD COUNT (test code = WBC) 5.7 10 3/uL     4.0-10.5    

    

 

                RED CELL DISTRIBUTION WIDTH (test code = RDW) 13.4 %          11

.5-14.0       

 

                ABSOLUTE BASOPHILS # (AUTO) (test code = BA#) 0.1 10 3/uL     0.

0-0.2         

 

                MEAN CORPUSCULAR HGB CONC (test code = MCHC) 32.2 g/dL       32.

0-36.0       

 

                LYMPHOCYTES % (AUTO) (test code = LY%) 50.8 %          13-45    

       

 

                BASOPHILS % (AUTO) (test code = BA%) 1.1 %           0-2        

     

 

                HEMOGLOBIN (test code = HGB) 10.7 g/dL       12.0-15.5       

 

                MEAN CORPUSCULAR VOLUME (test code = MCV) 81 fl           80-97 

          

 

                ABSOLUTE EOSINOPHILS # (AUTO) (test code = EO#) 0.1 10 3/uL     

0.0-0.6         

 

                EOSINOPHILS % (AUTO) (test code = EO%) 0.9 %           0-6      

       

 

                ABSOLUTE NEUT (AUTO) (test code = NE#) 2.3 10 3/uL     1.7-8.2  

       

 

                MONOCYTES % (AUTO) (test code = MO%) 7.2 %           3-13       

     

 

                ABSOLUTE MONOCYTES (AUTO) (test code = MO#) 0.4 10 3/uL     0.1-

1.4         



hCG, [id number] 08:53:00





                Test Item       Value           Reference Range Comments

 

                hCG, Quantitative (test code = 079740) <2.0 mIU/mL              

       



CBC with [id number] 08:53:00





                Test Item       Value           Reference Range Comments

 

                Platelet Count (test code = 337 K/uL        150-400         



                081592)                                         

 

                MCHC (test code = 006757) 31.9 g/dL       30.0-36.0       

 

                Granulocyte % (test code = 46 %            43-77           



                230933)                                         

 

                Hemoglobin (test code = 111848) 11.3 g/dL       12.0-15.0       

 

                MPV (test code = 536508) 11.1 fL         8.6-12.4        

 

                Absolute Lymph (test code = 3.2 K/uL        0.7-4.0         



                459914)                                         

 

                Hematocrit (test code = 854962) 35.4 %          36.0-46.0       

 

                RDW (test code = 736401) 15.0 %          11.5-15.5       

 

                Smear Review (test code = Criteria for review not met           

      



                513595)                                         

 

                Absolute Mono (test code = 0.7 K/uL        0.1-1.0         



                456063)                                         

 

                Absolute Gran (test code = 3.4 K/uL        1.7-7.7         



                424764)                                         

 

                Absolute Baso (test code = 0.1 K/uL        0.0-0.1         



                078625)                                         

 

                Lymph % (test code = 414488) 43 %            12-46           

 

                WBC (test code = 319119) 7.4 K/uL        4.0-10.5        

 

                RBC (test code = 140448) 4.35 MIL/uL     3.87-5.11       

 

                Eos % (test code = 227343) 0 %             0-5             

 

                MCH (test code = 869562) 26.0 pg         26.0-34.0       

 

                MCV (test code = 110176) 81.4 fL         78.0-100.0      

 

                Absolute Eos (test code = 0.0 K/uL        0.0-0.7         



                074235)                                         

 

                Baso % (test code = 619000) 1 %             0-1             

 

                Mono % (test code = 208866) 10 %            3-12            



BMP with Estimated [id number] 08:53:00





                Test Item       Value           Reference Range Comments

 

                Sodium (test code = 280494) 137 mmol/L      135-146         

 

                Est GFR, NonAfrican American (test code = 982982) 86 mL/min     

  >=60            

 

                Calcium (test code = 721825) 9.7 mg/dL       8.6-10.2        

 

                Potassium (test code = 719033) 4.3 mmol/L      3.5-5.3         

 

                Creatinine (test code = 206328) 0.81 mg/dL      0.50-1.10       

 

                Est GFR,  (test code = 535178) >89 mL/min      >

=60            

 

                Chloride (test code = 323047) 104 mmol/L                

 

                CO2 (test code = 511761) 29 mmol/L       20-31           

 

                Glucose (test code = 316205) 84 mg/dL        65-99           

 

                BUN (test code = 432120) 18 mg/dL        7-25            



URINE CULTURE\S\-42-57 10:40:00





                Test Item       Value           Reference Range Comments

 

                MIXED UROGENITAL JONNATHAN (test code = MSF)                        

         

 

                URINE CULTURE (test code = URC) See Comment                     







Assessments







                Condition Name  Status          Diagnosis Date  Treating Clinici

an

 

                Low back pain   Active          2020 10:21:59 

 

                Low back pain   Active          2020-11-10 11:35:51 

 

                Low back pain   Active          2020-10-13 13:30:12 

 

                Lumbar spondylolisthesis Active          2020 11:05:42 

 

                Spondylolisthesis Active          2020 09:02:13 

 

                Low back pain   Active          2020 09:05:00 

 

                Lumbar spondylosis Active          2020 09:30:53 

 

                Spondylolisthesis Active          2020 09:30:58 

 

                Long-term drug therapy Active          2020 16:45:26 

 

                Lumbar spondylosis Active          2020 08:40:51 

 

                Spondylolisthesis Active          2020 08:40:56 

 

                Low back pain   Active          2020 08:29:35 

 

                Lumbar spondylosis Active          2020 08:58:52 

 

                Degeneration of lumbar intervertebral Active          2020

 08:58:56 



                disc                                            

 

                Spondylolisthesis Active          2020 08:59:01 

 

                Lumbosacral spondylosis without Active          2020 12:55

:48 



                myelopathy                                      

 

                Spastic paraparesis Active          2020 12:56:10 

 

                History of benign neoplasm of brain Active          2019 1

0:20:44 

 

                Lumbosacral spondylosis without Active          2020 10:31

:48 



                myelopathy                                      

 

                Spastic paralysis Active          2020 10:32:03 

 

                Essential (primary) hypertension Active                         

 

 

                Encntr screen mammogram for malignant Active                    

      



                neoplasm of breast                                 

 

                Spondyls w/o myelopathy or Active                          



                radiculopathy, lumbosacr region                                 

 

                Body mass index (BMI) 25.0-25.9, adult Active                   

       

 

                Upper motor neurone lesion Active          2019 09:08:35 

 

                Low back strain Active          2019 08:46:57 

 

                Essential (primary) hypertension Active                         

 

 

                Nonscarring hair loss, unspecified Active                       

   

 

                Encntr screen mammogram for malignant Active                    

      



                neoplasm of breast                                 

 

                Body mass index (BMI) 22.0-22.9, adult Active                   

       

 

                Acute sinusitis, unspecified Active                          

 

                Allergic rhinitis due to pollen Active                          

 

                Essential (primary) hypertension Active                         

 

 

                Body mass index (BMI) 22.0-22.9, adult Active                   

       

 

                Pain in right hand Active                          

 

                Palpitations    Active                          

 

                Acute sinusitis, unspecified Active                          

 

                Body mass index (BMI) 22.0-22.9, adult Active                   

       

 

                Right upper quadrant pain Active                          

 

                Lateral epicondylitis, right elbow Active                       

   

 

                Gastro-esophageal reflux disease Active                         

 



                without esophagitis                                 

 

                Abnormal levels of other serum enzymes Active                   

       

 

                HSP (hereditary spastic paraplegia) Unknown                     

    



                (CMS-HCC)                                       

 

                HSP (hereditary spastic paraplegia) Unknown                     

    



                (CMS-HCC)                                       

 

                HSP (hereditary spastic paraplegia) Unknown                     

    



                (CMS-[facility])                                       







Encounters







        Start   End     Encounter Admission Attending Care    Care    Encounter



        Date/Time Date/Time Type    Type    Clinicians Facility Department ID

 

        2020 [facility] Galvan                 [facility]

 [id number]



        00:00:00 00:00:00 MD Lito:                 nolan P.A. P.A.    [id number]



                        [address]                                    



                        [place], [place]                                      



                        [address],                                 



                        Ph. [phone]                                 



                        [phone]                                 

 

        2020-11-10 2020-11-10 Roxane                 [facility], 881

[id number]



        00:00:00 00:00:00 Denise francisco P.A. P.A.    75764



                        [place] Markham: [address]                                 



                        [place], [place]                                      



                        [address],                                 



                        Ph. [phone]                                 



                        [phone]                                 

 

        2020 Outpatient         GRACIELA GUEVARA    [facility]    [id number]

[id number]



        17:06:45 17:06:45                 FRANNY                 

 

        2020-10-13 2020-10-13 Eyal                 [facility], 881

[id number]



        00:00:00 00:00:00 Free, PA-C:                 PAUL francisco.A. P.A.    [id number]



                        [address] [place] [address]                                      



                        [address],                                 



                        Ph. [phone]                                 



                        [phone]                                 

 

        [facility]    [id number]





        10:30:57 10:30:57                 FABY                 

 

        2020 Aki Galvan                 [facility],

 [id number]



        00:00:00 00:00:00 MD Lito:                 PAUL francisco.A. P.A.    30650



                        [address] [place] [address]                                      



                        [address],                                 



                        Ph. [phone]                                 



                        [phone]                                 

 

        2020 [facility]

[id number]



        00:00:00 00:00:00 nolan Calabrese P.A. P.A.    71937



                        PA-C: [address] [place] [address]                                      



                        [address],                                 



                        Ph. [phone]                                 



                        [phone]                                 

 

        2020 Keith                 [facility]

[id number]



        00:00:00 00:00:00 nolan Calabrese P.A. P.A.    15454



                        PA-C: [address] [place] [address]                                      



                        [address],                                 



                        Ph. [phone]                                 



                        [phone]                                 

 

        2020 Keith                 [facility], 881

[id number]



        00:00:00 00:00:00 nolan Calabrese P.A. P.A.    25480



                        PA-C: [address]                                 



                        [place]                                      



                        [address],                                 



                        Ph. [phone]                                 



                        [phone]                                 

 

        2020 Keith                 [facility]

[id number]



        00:00:00 00:00:00 nolan Calabrese P.A. P.A.    85328



                        PA-C: [address] [place] [address]                                      



                        [address],                                 



                        Ph. [phone]                                 



                        [phone]                                 

 

        2020 Outpatient         CHELSY SUN [facility]    2214

[id number]



        09:00:42 09:00:42                                         

 

        2020 Outpatient                 [facility]    [id number]

[id number]



        00:00:00 00:00:00                                         

 

        2020 Outpatient                 [facility]    [id number]

[id number]



        00:00:00 00:00:00                                         

 

        2020 Jose GOSS                 [facility], 8

[id number]



        00:00:00 00:00:00 nolan Slater, P.A. P.A.    44674



                        MD: Yasmany                                 



                        [place], [place]                                      



                        [address],                                 



                        Ph. [phone]                                 



                        [phone]                                 

 

        2020 [facility]         [facility]    [id number]

[id number]



        09:53:57 11:02:21                 YONATAN                 

 

        2019 [facility], 8

[id number]



        00:00:00 00:00:00 nolan Slater, P.A. P.A.    48630



                        MD: Yasmany                                 



                        [facility]                                 



                        [place]                                      



                        [address],                                 



                        Ph. [phone]                                 



                        [phone]                                 

 

        2019 Outpatient         Yara, [facility] C

[id number]



          13:30:00  13:30:00                      Roc               Reba



                                       [id number]



                                                                      

s                                       [id number]



                                                         and    [id number]



                                                        Multispecialty 



                                                        Clinic, 

 

        2019 Jose GOSS                 [facility], 8

[id number]



        00:00:00 00:00:00 nolan Slater, P.A. P.A.    57380



                        MD: Yasmany                                 



                        [place], [place]                                      



                        [address],                                 



                        Ph. [phone]                                 



                        [phone]                                 

 

        2019 [facility], 8

[id number]



        00:00:00 00:00:00 nolan Slater, P.A. P.A.    96763



                        MD: Yasmany                                 



                        [facility]                                 



                        [place]                                      



                        [address],                                 



                        Ph. [phone]                                 

 

        2018-10-08 2018-10-08 Outpatient         [facility] B

[id number]



          08:15:00  08:15:00                      Roc Britton



                                       [id number]



                                                                      

s                                       [id number]



                                                         and    [id number]



                                                        Multispecialty 



                                                        Clinic, PA 

 

        2018 Outpatient         [facility] 1

[id number]



          10:45:00  10:45:00                      Roc Britton



                                       [id number]



                                                                      

s                                       [id number]



                                                         and    [id number]



                                                        Multispecialty 



                                                        Clinic, PA 

 

        2018 Outpatient         [facility] F

[id number]



          15:15:00  15:15:00                      Roc Britton



                                       [id number]



                                                                      

s                                       [id number]



                                                         and    [id number]



                                                        Multispecialty 



                                                        Clinic, PA 

 

        2017 Outpatient         [facility]

[id number]



          10:15:00  10:15:00                      Roc Britton



                                       [id number]



                                                                      

s                                       [id number]



                                                         and    [id number]



                                                        Multispecialty 



                                                        Clinic, PA 







Plan of Treatment







                Planned Activity Planned Date    Details         Comments

 

                Future Scheduled Test                  [code = ]      

 

                Future Scheduled Test                  [code = ]      

 

                Future Scheduled Test                  [code = ]      

 

                Future Scheduled Test                  [code = ]      

 

                Future Scheduled Test                  [code = ]      

 

                Future Scheduled Test                  [code = ]      

 

                Future Scheduled Test                  [code = ]      

 

                Future Scheduled Test                  [code = ]      

 

                Future Scheduled Test                  [code = ]      

 

                Future Scheduled Test                  [code = ]      

 

                Future Scheduled Test                  [code = ]      

 

                Future Scheduled Test                  [code = ]      

 

                Future Scheduled Test                  [code = ]      

 

                Future Scheduled Test                  [code = ]      

 

                Future Scheduled Test                  [code = ]      

 

                Future Scheduled Test                  [code = ]      

 

                Future Appointment 2021 11:00:00 Faby Marie MD, [facility]

 [place] [address] 

 

                Future Appointment 2021 00:00:00 Aki Morse, Yasmany Romo

[facility]; 



                                                , [place] [address] 







Social History







                Social Habit    Start Date      Stop Date       Comments

 

                History SDOH Alcohol Binge                                 

 

                Exposure to SARS-CoV-2 (event)                                 

 

                History SDOH Alcohol Std Drinks                                 

 

                Tobacco use and exposure 2020 00:00:00 2020 00:00:00

 

 

                Alcohol intake  2020 00:00:00 2020 00:00:00 

 

                History SDOH Alcohol Frequency 2020 00:00:00 2020 00

:00:00 









                    Smoking Status      Start Date          Stop Date

 

                    Never smoker                            2020 00:00:00







Vital Signs







                Vital Name      Observation Time Observation Value Comments

 

                Height          2020 00:00:00 62 [in_i]       

 

                BMI (Body Mass Index) 2020 00:00:00 24.7 kg/m2      

 

                Body Weight     2020 00:00:00 135 [lb_av]     

 

                Height          2020-11-10 00:00:00 62 [in_i]       

 

                Body Weight     2020-10-13 00:00:00 135 [lb_av]     

 

                Height          2020-10-13 00:00:00 62 [in_i]       

 

                BMI (Body Mass Index) 2020-10-13 00:00:00 24.7 kg/m2      

 

                Systolic blood pressure 2020 11:28:00 122 mm[Hg]      

 

                Diastolic blood pressure 2020 11:28:00 80 mm[Hg]       

 

                Heart rate      2020 11:28:00 81 /min         

 

                Body temperature 2020 11:28:00 36.22 Telma       

 

                Body height     2020 11:28:00 157.5 cm        

 

                Body weight     2020 11:28:00 63.73 kg        

 

                BMI             2020 11:28:00 25.70 kg/m2     

 

                Height          2020 00:00:00 62 [in_i]       

 

                BMI (Body Mass Index) 2020 00:00:00 24.7 kg/m2      

 

                Body Weight     2020 00:00:00 135 [lb_av]     

 

                Height          2020 00:00:00 62 [in_i]       

 

                BMI (Body Mass Index) 2020 00:00:00 24.7 kg/m2      

 

                Body Weight     2020 00:00:00 135 [lb_av]     

 

                Height          2020 00:00:00 62 [in_i]       

 

                BMI (Body Mass Index) 2020 00:00:00 24.7 kg/m2      

 

                Body Weight     2020 00:00:00 135 [lb_av]     

 

                Height          2020 00:00:00 62 [in_i]       

 

                BMI (Body Mass Index) 2020 00:00:00 24.7 kg/m2      

 

                Body Weight     2020 00:00:00 135 [lb_av]     

 

                Height          2020 00:00:00 62 [in_i]       

 

                BMI (Body Mass Index) 2020 00:00:00 24.7 kg/m2      

 

                Body Weight     2020 00:00:00 135 [lb_av]     

 

                Height          2019 00:00:00 62 [in_i]       

 

                BMI (Body Mass Index) 2019 00:00:00 24.7 kg/m2      

 

                Body Weight     2019 00:00:00 135 [lb_av]     

 

                Height          2019 00:00:00 62 [in_i]       

 

                BMI (Body Mass Index) 2019 00:00:00 24.7 kg/m2      

 

                Body Weight     2019 00:00:00 135 [lb_av]     

 

                Height          2019 00:00:00 62 [in_i]       

 

                BMI (Body Mass Index) 2019 00:00:00 24.1 kg/m2      

 

                Body Weight     2019 00:00:00 132 [lb_av]     







Hospital Discharge Instructions

1. Low back pain  back care and preventing injuries: care instructions  
getting back to normalafter low back pain: care instructions  learning about 
relief for back pain  XR, lumbar spine  physical therapy back referral - 
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 
paralysis. Assess and treat and wean from walker when safe Discussion Note: None
 recorded.Patient Instructions Brynn Couch RN - 2020 1:30 PM [facility] Contact Information: Office Hours: 8am-4:30pm Monday- Friday 
Office Fax: [phone] For Appointments call our officeMain Line: [phone]
 For Medical Concerns or Refills call our Nurse Line: [phone] Please note, 
it may take up to 48 hours to complete your refill requests and up to one week 
to complete needed forms. For Urgent Concerns DURING business hours call our 
Nurse Line: [phone] The Nurse Line is checked throughout the day. For 
Urgent Concerns AFTER HOURS call our On-Call pager: [phone] For E
mergencies, call 911. Your local ED and or Admitting team can reach our on-call 
pager for medical management advice. Electronically signed by Brynn Couch RN at
 2020 2:08 PM EST documented in this encounterPatient Instructions Cj Calvin RN - 2020 11:00 AM [facility], our team is committed to 
providing you compassionate, world-class care. You may receive a patient 
satisfaction survey by mail or email regarding your visit today. Your opinion is
 important to me. Your response benefits us all. Patient Education tizanidine 
Pronunciation: tsering cintron Brand: Zanaflex What is the most important 
information I should know about tizanidine? You should not take tizanidine if 
you are also taking fluvoxamine (Luvox) or ciprofloxacin (Cipro). Do not use 
tizanidine at a time when you need muscle tone for safe balance and movement 
during certain activities. What is tizanidine? Tizanidine is a short-acting 
muscle relaxer. It works by blocking nerve impulses (pain sensations) that are 
sent to your brain. Tizanidine is used to treat spasticity by temporarily 
relaxing muscle tone. Tizanidine mayalso be used for purposes not listed in this
 medication guide. What should I discuss with my healthcare provider before 
taking tizanidine? You should not use tizanidine if you are allergic to it, or 
if:  you also take the antidepressant fluvoxamine (Luvox); or  you also take
 the antibiotic ciprofloxacin (Cipro). To make sure tizanidine is safe for you, 
tell your doctor if you have:  liver disease;  kidney disease; or  low 
blood pressure. It is not known whether this medicine will harm an unborn baby. 
Tell your doctor if you are pregnant or plan to become pregnant. It is not known
 whether tizanidine passes into breast milk or if it could harm a nursing baby. 
Tell your doctor if you are breast-feeding a baby. How should I take tizanidine?
 Follow all directions on your prescription label. Your doctor may occasionally 
change your dose to make sure you get the best results. Do not use this medicine
 in larger or smaller amounts or for longer than recommended. In most cases you 
may take tizanidine up to 3 times in one day if needed. Allow 6 to 8 hours to 
pass between doses. You may take tizanidine with or without food, but take it 
the same way each time. Switching between taking tizanidinewith food and taking 
it without food can make the medicine less effective or cause increased side eff
ects. Switching between tizanidine tablets and capsules can also cause changes 
in side effects or how well the medicine works.  Taking the tablets with food 
can increase your blood levels of tizanidine.  Taking the capsules with food 
can decrease your blood levels of tizanidine. Follow your doctor's instructions 
carefully. After making any changes in how you take tizanidine, contact your 
doctor if you notice any change in side effects or in how well the medicine 
works. Tizanidine is a short-acting medication, and its effects will be most 
noticeable between 1 and 3 hours after you take it. You should take tizanidine 
only for daily activities that require relief from muscle spasms. Do not take mo
re than three doses (36 mg) in a 24-hour period. Too much of this medicine can 
damage your liver. You will need frequent blood tests to check your liver 
function. If you stop using tizanidine suddenly after long-term use, you may 
have withdrawal symptoms such as dizziness, fast heartbeats, tremors, and 
anxiety. Ask your doctor how to safely stop using this medicine. Store at room 
temperature away from moisture and heat. What happens if I miss a dose? Take the
 missed dose as soon as you remember. Skip the missed dose if it is almost time 
for your next scheduled dose. Do not take extra medicine to make up the missed 
dose. What happens if I overdose? Seek emergency medical attention or call the 
Poison Help line at [phone]. Overdose symptoms may include weakness, 
drowsiness, confusion, slow heart rate, shallow breathing, feeling light-headed,
 or fainting. What should I avoid while taking tizanidine? Do not use tizanidine
 at a time when you need muscle tone for safe balance and movement during 
certain activities. In some situations, it may be dangerous for you to have 
reduced muscle tone. Drinking alcohol with this medicine can cause side effects.
 This medicine may impair your thinking or reactions. Be careful if you drive or
 do anything that requires you to be alert. Avoid getting up too fast from a 
sitting or lying position, or you may feel dizzy. Get up slowly and steady 
yourself to prevent a fall. What are the possible side effects of tizanidine? 
Get emergency medical help if you have signs of an allergic reaction: hives; 
difficult breathing; swelling of your face, lips, tongue, or throat. Call your 
doctor at once if you have:  a light-headed feeling, like you might pass out; 
 weak or shallow breathing;  confusion, hallucinations; or  pain or 
burning when you urinate. Common side effects may include:  drowsiness, 
dizziness, weakness;  feeling nervous;  blurred vision;  flu-like 
symptoms;  dry mouth, trouble speaking;  abnormal liver function tests;  
runny nose, sore throat;  urination problems;  vomiting, constipation; or 
 uncontrolled muscle movements. This is not a complete list of side effects and 
others may occur. Call your doctor for medical advice about side effects. You 
may report side effects to FDA at 6-501-FDA-7967. What other drugs will affect 
tizanidine? Taking tizanidine with other drugs that make you sleepy or slow your
 breathing cancause dangerous side effects or death. Ask your doctor before 
taking a sleeping pill, narcotic pain medicine, prescription cough medicine, a 
muscle relaxer, or medicine for anxiety, depression, or seizures. Tell your 
doctor about all your current medicines and any you start or stop using, 
especially:  acyclovir;  ticlopidine;  zileuton;  birth control pills; 
 an antibiotic --ciprofloxacin, gemifloxacin, levofloxacin, moxifloxacin, or 
ofloxacin;  blood pressure medicine --clonidine, guanfacine, methyldopa;  
heart rhythm medicine --amiodarone, mexiletine, propafenone, verapamil; or  st
omach acid medicine --cimetidine, famotidine. This list is not complete. Other 
drugs may interact with tizanidine, including prescription and over-the-counter 
medicines, vitamins, and herbal products. Not all possible interactions are 
listed in this medication guide. Where can I get more information? Your 
pharmacist can provide more information about tizanidine. Remember, keep this 
and all other medicines out of the reach of children, never share your medicines
 with others, and use this medication only for the indication prescribed. Every 
effort has been made to ensure that the information providedby [employer] is accurate, up-to-date, and complete, but no guarantee is made 
tothat effect. Drug information contained herein may be time sensitive. [employer] 
information has been compiled for use by healthcare practitioners and consumers 
in the [place] and therefore [employer] does not warrant that uses outside of 
the [place] are appropriate, unless specifically indicated otherwise. 
[employer] drug information does not endorse drugs, diagnose patients or recommend
 therapy. [employer] drug information is an informational resource designed to 
assist licensed healthcare practitioners in caring for their patients and/or to 
serve consumers viewing this service as a supplement to, and not a substitute 
for, the expertise, skill, knowledge and judgment of healthcare practitioners.
The absence of a warning for a given drug or drug combination in no way should 
be construed to indicate that the drug or drug combination is safe, effective or
 appropriate for any given patient. [employer] not assume any responsibility for
 any aspect of healthcare administered with the aid of information [employer] 
provides. The information contained herein is not intended to cover all possible
 uses, directions, precautions, warnings, drug interactions, allergic reactions,
 or adverse effects. If you have questions about the drugs you are taking, check
 with your doctor, nurse or pharmacist. Copyright 9130-0183 [employer] 
Version: 3.01. Revision date: 2017. Care instructions adapted underlicense 
by your healthcare professional. If you have questions about a medical condition
 or this instruction, always ask your healthcare professional. [employer] disclaims any warranty orliability for your use of this 
information. Electronically signed by Faby Marie MD at 2020 
11:31 AM EDT documented in this encounter1. Low back pain  XR, lumbar spine 
2. Lumbar spondylosis 3. Spondylolisthesis  spondylolysis and 
spondylolisthesis: exercises  hydrocodone 5 mg-acetaminophen 325 mg tablet 
 MRI, lumbar spine, w/o contrast - F/U with Dr. Morse Discussion Note: None 
recorded.1. Low back pain  XR, lumbar spine 2. Lumbar spondylosis 3. 
Degeneration of lumbar intervertebraldisc 4. Spondylolisthesis  spondylolysis
 and spondylolisthesis: exercises  buprenorphine 5 mcg/hour weekly 
transdermal patch Discussion Note: None recorded.1. Lumbosacral spondylosis 
without myelopathy 2. Spastic paraparesis Discussion Note: None recorded.Patient
 educational handouts: No information available.1. History of benign neoplasm of
 brain  MRI, brain, w/wo contrast 2. Lumbosacral spondylosis without 
myelopathy 3. Spastic paralysis Discussion Note: None recorded. Patient 
educational handouts: No information available.1. Low back strain Discussion 
Note: None recorded. Patient educational handouts: No information available.